# Patient Record
Sex: MALE
[De-identification: names, ages, dates, MRNs, and addresses within clinical notes are randomized per-mention and may not be internally consistent; named-entity substitution may affect disease eponyms.]

---

## 2018-04-20 ENCOUNTER — RX ONLY (OUTPATIENT)
Age: 28
Setting detail: RX ONLY
End: 2018-04-20

## 2018-04-20 RX ORDER — FINASTERIDE 1 MG/1
1 TABLET, FILM COATED ORAL QD
Qty: 90 | Refills: 1 | Status: ERX | COMMUNITY
Start: 2018-04-20

## 2018-10-29 ENCOUNTER — APPOINTMENT (RX ONLY)
Dept: URBAN - METROPOLITAN AREA CLINIC 34 | Facility: CLINIC | Age: 28
Setting detail: DERMATOLOGY
End: 2018-10-29

## 2018-10-29 DIAGNOSIS — Z79.899 OTHER LONG TERM (CURRENT) DRUG THERAPY: ICD-10-CM

## 2018-10-29 DIAGNOSIS — L64.8 OTHER ANDROGENIC ALOPECIA: ICD-10-CM

## 2018-10-29 PROCEDURE — 99212 OFFICE O/P EST SF 10 MIN: CPT

## 2018-10-29 PROCEDURE — ? LAB REPORTS REVIEWED

## 2018-10-29 PROCEDURE — ? COUNSELING

## 2018-10-29 PROCEDURE — ? PRESCRIPTION

## 2018-10-29 PROCEDURE — ? PRESCRIPTION MEDICATION MANAGEMENT

## 2018-10-29 PROCEDURE — ? ORDER TESTS

## 2018-10-29 PROCEDURE — ? PATIENT SPECIFIC COUNSELING

## 2018-10-29 RX ORDER — FINASTERIDE 1 MG/1
1 TABLET, FILM COATED ORAL QD
Qty: 30 | Refills: 12 | Status: ERX

## 2018-10-29 ASSESSMENT — LOCATION ZONE DERM: LOCATION ZONE: SCALP

## 2018-10-29 ASSESSMENT — LOCATION SIMPLE DESCRIPTION DERM: LOCATION SIMPLE: ANTERIOR SCALP

## 2018-10-29 ASSESSMENT — LOCATION DETAILED DESCRIPTION DERM: LOCATION DETAILED: MID-FRONTAL SCALP

## 2018-10-29 NOTE — PROCEDURE: LAB REPORTS REVIEWED
Summarized Lab Results: 03/31/17 labs reviewed with patient. He was given copy of report.  eGFR 110, AST 16, ALT 16, total PSA 1, fPSA 0.2
Detail Level: Zone

## 2018-10-29 NOTE — HPI: HAIR LOSS (ALOPECIA AREATA)
How Severe Is It?: moderate
Is This A New Presentation, Or A Follow-Up?: Hair Loss
Additional History: Tolerating treatment very well since he was last seen in 2016. Denies impotence, decreased libido, depression, loss of appetite. Pleased with improvement. Has noted new hair growth at frontal hairline over the years. Has also noted that existing hair seems healthier - has been able to grow it long. Needs refill for Rx. Last labs were done in 2017. He has not seen urologist this past year.

## 2018-10-29 NOTE — PROCEDURE: MIPS QUALITY
Quality 110: Preventive Care And Screening: Influenza Immunization: Influenza Immunization not Administered for Documented Reasons.
Additional Notes: flu 2018-19 pending - he will get it done soon.
Detail Level: Simple

## 2018-10-29 NOTE — PROCEDURE: PRESCRIPTION MEDICATION MANAGEMENT
Plan: Annual urology evaluation in addition to lab work to monitor for drug effects.  Lab tests ordered today - will discuss results by phone
Detail Level: Simple
Render In Strict Bullet Format?: No
Continue Regimen: finasteride 1 mg by mouth daily

## 2019-11-20 ENCOUNTER — APPOINTMENT (RX ONLY)
Dept: URBAN - METROPOLITAN AREA CLINIC 124 | Facility: CLINIC | Age: 29
Setting detail: DERMATOLOGY
End: 2019-11-20

## 2019-11-20 DIAGNOSIS — L64.8 OTHER ANDROGENIC ALOPECIA: ICD-10-CM

## 2019-11-20 PROCEDURE — ? ORDER TESTS

## 2019-11-20 PROCEDURE — ? PRESCRIPTION

## 2019-11-20 PROCEDURE — ? COUNSELING

## 2019-11-20 PROCEDURE — 99202 OFFICE O/P NEW SF 15 MIN: CPT

## 2019-11-20 PROCEDURE — ? PATIENT SPECIFIC COUNSELING

## 2019-11-20 PROCEDURE — ? DIAGNOSIS COMMENT

## 2019-11-20 RX ORDER — FINASTERIDE 1 MG/1
TABLET, FILM COATED ORAL QD
Qty: 30 | Refills: 6 | Status: ERX | COMMUNITY
Start: 2019-11-20

## 2019-11-20 RX ADMIN — FINASTERIDE: 1 TABLET, FILM COATED ORAL at 15:02

## 2019-11-20 ASSESSMENT — LOCATION ZONE DERM: LOCATION ZONE: SCALP

## 2019-11-20 ASSESSMENT — LOCATION SIMPLE DESCRIPTION DERM: LOCATION SIMPLE: ANTERIOR SCALP

## 2019-11-20 ASSESSMENT — LOCATION DETAILED DESCRIPTION DERM: LOCATION DETAILED: MID-FRONTAL SCALP

## 2019-11-20 NOTE — HPI: HAIR LOSS (ALOPECIA AREATA)
How Severe Is It?: mild
Is This A New Presentation, Or A Follow-Up?: Hair Loss
Additional History: Pt was prescribed finasteride by Dr. Brittany Yang

## 2019-11-20 NOTE — PROCEDURE: DIAGNOSIS COMMENT
Comment: Pt was advised all side effects of propecia, pt advised to see a urologist for sperm count and viability exam. Patient verbalizes understanding. He has read a lot about Propecia on internet.
Detail Level: Simple

## 2019-11-20 NOTE — PROCEDURE: ORDER TESTS
Performing Laboratory: -118
Billing Type: [employer]
Expected Date Of Service: 11/20/2019
Bill For Surgical Tray: no

## 2020-12-08 NOTE — PROCEDURE: COUNSELING
Detail Level: Zone Wartpeel Counseling:  I discussed with the patient the risks of Wartpeel including but not limited to erythema, scaling, itching, weeping, crusting, and pain.